# Patient Record
Sex: MALE | Race: WHITE
[De-identification: names, ages, dates, MRNs, and addresses within clinical notes are randomized per-mention and may not be internally consistent; named-entity substitution may affect disease eponyms.]

---

## 2023-08-09 DIAGNOSIS — R06.09 OTHER FORMS OF DYSPNEA: ICD-10-CM

## 2023-08-10 ENCOUNTER — APPOINTMENT (OUTPATIENT)
Dept: HEART AND VASCULAR | Facility: CLINIC | Age: 68
End: 2023-08-10
Payer: SELF-PAY

## 2023-08-10 VITALS — HEIGHT: 66 IN | TEMPERATURE: 98 F | OXYGEN SATURATION: 98 % | HEART RATE: 81 BPM

## 2023-08-10 VITALS — BODY MASS INDEX: 25.02 KG/M2 | WEIGHT: 155 LBS

## 2023-08-10 VITALS — DIASTOLIC BLOOD PRESSURE: 85 MMHG | SYSTOLIC BLOOD PRESSURE: 130 MMHG

## 2023-08-10 DIAGNOSIS — E03.9 HYPOTHYROIDISM, UNSPECIFIED: ICD-10-CM

## 2023-08-10 DIAGNOSIS — E78.5 HYPERLIPIDEMIA, UNSPECIFIED: ICD-10-CM

## 2023-08-10 DIAGNOSIS — Z78.9 OTHER SPECIFIED HEALTH STATUS: ICD-10-CM

## 2023-08-10 DIAGNOSIS — Z00.00 ENCOUNTER FOR GENERAL ADULT MEDICAL EXAMINATION W/OUT ABNORMAL FINDINGS: ICD-10-CM

## 2023-08-10 PROCEDURE — 99204 OFFICE O/P NEW MOD 45 MIN: CPT | Mod: 25

## 2023-08-10 PROCEDURE — 36415 COLL VENOUS BLD VENIPUNCTURE: CPT

## 2023-08-10 PROCEDURE — 93000 ELECTROCARDIOGRAM COMPLETE: CPT

## 2023-08-10 PROCEDURE — 93306 TTE W/DOPPLER COMPLETE: CPT

## 2023-08-10 NOTE — REASON FOR VISIT
[FreeTextEntry1] : Pt. is a 67-year-old M with PMHx of HLD and hypothyroidism who presents today to establish cardiac care.

## 2023-08-10 NOTE — PHYSICAL EXAM
[Normal Conjunctiva] : normal conjunctiva [Normal Venous Pressure] : normal venous pressure [No Carotid Bruit] : no carotid bruit [Normal S1, S2] : normal S1, S2 [Normal Gait] : normal gait [No Edema] : no edema [Normal PT B/L] : normal PT B/L [Normal] : alert and oriented, normal memory [de-identified] : 2/6 systolic murmur LLSB

## 2023-08-10 NOTE — ASSESSMENT
[FreeTextEntry1] : Pt. is a 67-year-old M with PMHx of HLD and hypothyroidism who presents today to establish cardiac care.   HLD/ASCVD risk: - EKG today: NSR @ 76 bpm with 1st degree AVB - Lipids/A1c today for ASCVD risk factor assessment  - For now, continue Simvastatin 40 mg qd for lipid management. Pending labs, will consider switching to Rosuvastatin or Atorvastatin. Goal LDL < 100 - BMI healthy - BP well-controlled off medication - Encouraged patient to continue healthy exercise and eating habits, focusing on a Mediterranean style of eating and aiming for the recommended 150 minutes per week of moderate physical activity   Hypothyroidism: - TFTs today - Continue Synthroid 50 mcg qd - will titrate pending labs   RTC for stress EKG. I, Dr. Leon, personally performed the evaluation and management (E/M) services for this new patient. That E/M includes conducting the clinically appropriate initial history &/or exam, assessing all conditions, and establishing the plan of care. Today, my ACP, Margaret Melendez, was here to observe &/or participate in the visit & follow plan of care established by me.

## 2023-08-10 NOTE — HISTORY OF PRESENT ILLNESS
[FreeTextEntry1] : Prior PCP/Cardiologist: Dr. Augustus Forbes   Patient presents today to establish cardiac care. He is feeling well with no acute issues or concerns.   Activity: he remains active walking, anywhere from 4-6 miles/day. He used to run but is no longer able to due to pain in his left knee. No exertional chest pain or shortness of breath.  Diet: he has been more lenient with his diet due to traveling a lot recently. He typically adheres to a Mediterranean-type diet.  Pt. denies any orthopnea, PND, palpitations, lightheadedness, syncope or lower extremity edema.

## 2023-08-11 LAB
ALBUMIN SERPL ELPH-MCNC: 4.9 G/DL
ALP BLD-CCNC: 59 U/L
ALT SERPL-CCNC: 27 U/L
ANION GAP SERPL CALC-SCNC: 13 MMOL/L
AST SERPL-CCNC: 26 U/L
BILIRUB SERPL-MCNC: 0.5 MG/DL
BUN SERPL-MCNC: 17 MG/DL
CALCIUM SERPL-MCNC: 9.6 MG/DL
CHLORIDE SERPL-SCNC: 106 MMOL/L
CHOLEST SERPL-MCNC: 163 MG/DL
CK SERPL-CCNC: 137 U/L
CO2 SERPL-SCNC: 22 MMOL/L
CREAT SERPL-MCNC: 0.97 MG/DL
EGFR: 85 ML/MIN/1.73M2
ESTIMATED AVERAGE GLUCOSE: 126 MG/DL
FERRITIN SERPL-MCNC: 804 NG/ML
GLUCOSE SERPL-MCNC: 125 MG/DL
HBA1C MFR BLD HPLC: 6 %
HDLC SERPL-MCNC: 49 MG/DL
LDLC SERPL CALC-MCNC: 82 MG/DL
NONHDLC SERPL-MCNC: 113 MG/DL
POTASSIUM SERPL-SCNC: 4.5 MMOL/L
PROT SERPL-MCNC: 7.2 G/DL
PSA SERPL-MCNC: 0.25 NG/ML
SODIUM SERPL-SCNC: 141 MMOL/L
TRIGL SERPL-MCNC: 188 MG/DL
TSH SERPL-ACNC: 1.32 UIU/ML
VIT B12 SERPL-MCNC: 769 PG/ML

## 2023-08-14 LAB
IRON SATN MFR SERPL: 36 %
IRON SERPL-MCNC: 118 UG/DL
TIBC SERPL-MCNC: 325 UG/DL
UIBC SERPL-MCNC: 207 UG/DL

## 2023-08-15 LAB — TM INTERPRETATION: NORMAL

## 2023-10-24 ENCOUNTER — APPOINTMENT (OUTPATIENT)
Dept: HEART AND VASCULAR | Facility: CLINIC | Age: 68
End: 2023-10-24
Payer: SELF-PAY

## 2023-10-24 VITALS
WEIGHT: 160 LBS | RESPIRATION RATE: 14 BRPM | HEART RATE: 78 BPM | HEIGHT: 66 IN | DIASTOLIC BLOOD PRESSURE: 70 MMHG | BODY MASS INDEX: 25.71 KG/M2 | SYSTOLIC BLOOD PRESSURE: 108 MMHG

## 2023-10-24 PROCEDURE — ZZZZZ: CPT

## 2023-10-24 PROCEDURE — 93351 STRESS TTE COMPLETE: CPT

## 2023-10-24 PROCEDURE — 99213 OFFICE O/P EST LOW 20 MIN: CPT

## 2023-10-24 PROCEDURE — G0008: CPT

## 2023-10-24 PROCEDURE — 90662 IIV NO PRSV INCREASED AG IM: CPT

## 2024-01-22 RX ORDER — SIMVASTATIN 40 MG/1
40 TABLET, FILM COATED ORAL
Qty: 90 | Refills: 3 | Status: ACTIVE | COMMUNITY
Start: 1900-01-01 | End: 1900-01-01

## 2024-04-12 ENCOUNTER — RX RENEWAL (OUTPATIENT)
Age: 69
End: 2024-04-12

## 2024-04-12 RX ORDER — LEVOTHYROXINE SODIUM 0.05 MG/1
50 TABLET ORAL
Qty: 90 | Refills: 0 | Status: ACTIVE | COMMUNITY
Start: 1900-01-01 | End: 1900-01-01